# Patient Record
Sex: MALE | Race: WHITE | NOT HISPANIC OR LATINO | Employment: OTHER | URBAN - METROPOLITAN AREA
[De-identification: names, ages, dates, MRNs, and addresses within clinical notes are randomized per-mention and may not be internally consistent; named-entity substitution may affect disease eponyms.]

---

## 2018-04-13 NOTE — PATIENT DISCUSSION
Discussed importance of compliance with ocular meds and follow up exams to prevent loss of vision. English

## 2018-05-04 NOTE — PATIENT DISCUSSION
Patient informed on need for yag prior to H[facility]P [facility] if needed.

## 2018-07-12 NOTE — PROCEDURE NOTE: SURGICAL
Prior to commencing surgery patient identification, surgical procedure, site, and side were confirmed by Dr. Harish Peng. Following topical proparacaine anesthesia, the patient was positioned at the YAG laser, a contact lens coupled to the cornea with methylcellulose and an axial posterior capsulotomy performed without complication using 2.8 Mj x 35. Excess methylcellulose was washed from the eye, one drop of Alphagan was instilled and the patient returned to the holding area having tolerated the procedure well and without complication. <br />[facility]:[id number]

## 2018-07-12 NOTE — PATIENT DISCUSSION
Patient informed on need for yag prior to H[facility]P [facility] if needed.

## 2018-07-12 NOTE — PATIENT DISCUSSION
Patient informed on need for yag prior to [facility]P [facility] if needed.

## 2018-07-19 NOTE — PATIENT DISCUSSION
Patient informed on need for yag prior to H[facility]P [facility] if needed.

## 2018-07-19 NOTE — PATIENT DISCUSSION
Patient informed on need for yag prior to [facility]P [facility] if needed.

## 2018-07-19 NOTE — PROCEDURE NOTE: SURGICAL
Prior to commencing surgery patient identification, surgical procedure, site, and side were confirmed by Dr. Rodrick Begum. Following topical proparacaine anesthesia, the patient was positioned at the YAG laser, a contact lens coupled to the cornea with methylcellulose and an axial posterior capsulotomy performed without complication using 3.5 Mj x 21. Excess methylcellulose was washed from the eye, one drop of Alphagan was instilled and the patient returned to the holding area having tolerated the procedure well and without complication. <br />[facility]:[id number]

## 2018-07-26 NOTE — PATIENT DISCUSSION
patient informed on need for stable mr prior to lasik.  Once stable, consider trial cl to simulate LVC Goal OU.

## 2018-07-26 NOTE — PATIENT DISCUSSION
Patient informed that ERM might be causing decrease in vision at distance along with uncorrected astigmatism OD.

## 2019-07-24 ENCOUNTER — NEW PATIENT EMERGENCY (OUTPATIENT)
Dept: URBAN - METROPOLITAN AREA CLINIC 39 | Facility: CLINIC | Age: 84
End: 2019-07-24

## 2019-07-24 DIAGNOSIS — H25.812: ICD-10-CM

## 2019-07-24 DIAGNOSIS — H25.811: ICD-10-CM

## 2019-07-24 DIAGNOSIS — H16.041: ICD-10-CM

## 2019-07-24 DIAGNOSIS — H40.033: ICD-10-CM

## 2019-07-24 PROCEDURE — 99203 OFFICE O/P NEW LOW 30 MIN: CPT

## 2019-07-24 RX ORDER — PREDNISOLONE ACETATE 10 MG/ML
1 SUSPENSION/ DROPS OPHTHALMIC
Start: 2019-07-24

## 2019-07-24 ASSESSMENT — VISUAL ACUITY: OD_SC: 20/40

## 2019-07-24 ASSESSMENT — TONOMETRY: OD_IOP_MMHG: 14

## 2019-08-19 ENCOUNTER — ESTABLISHED COMPREHENSIVE EXAM (OUTPATIENT)
Dept: URBAN - METROPOLITAN AREA CLINIC 40 | Facility: CLINIC | Age: 84
End: 2019-08-19

## 2019-08-19 DIAGNOSIS — H52.4: ICD-10-CM

## 2019-08-19 DIAGNOSIS — H52.203: ICD-10-CM

## 2019-08-19 DIAGNOSIS — H25.811: ICD-10-CM

## 2019-08-19 DIAGNOSIS — H25.812: ICD-10-CM

## 2019-08-19 DIAGNOSIS — H40.033: ICD-10-CM

## 2019-08-19 DIAGNOSIS — H52.03: ICD-10-CM

## 2019-08-19 PROCEDURE — 92132 CPTRZD OPH DX IMG ANT SGM: CPT

## 2019-08-19 PROCEDURE — 92014 COMPRE OPH EXAM EST PT 1/>: CPT

## 2019-08-19 PROCEDURE — 92015 DETERMINE REFRACTIVE STATE: CPT

## 2019-08-19 ASSESSMENT — VISUAL ACUITY
OS_SC: 20/40
OS_SC: J6
OD_SC: J6
OD_SC: 20/25
OU_SC: 20/30
OU_SC: J3

## 2019-08-19 ASSESSMENT — TONOMETRY
OS_IOP_MMHG: 13
OD_IOP_MMHG: 12

## 2020-08-01 NOTE — PATIENT DISCUSSION
-- DO NOT REPLY / DO NOT REPLY ALL --  -- Message is from the [facility]--    COVID-19 Universal Screening: N/A - Not about scheduling    General Patient Message      Reason for Call: patient missed a call from the physician... please contact the patient back.    Caller Information       Type Contact Phone    08/01/2020 10:12 AM Phone (Incoming) Fabiola Benedict (Self) [phone] (H)          Alternative phone number: [phone]    Turnaround time given to caller:   \"This message will be sent to [state Provider's name]. The clinical team will fulfill your request as soon as they review your message.\"     Recommended observation.

## 2020-08-24 ENCOUNTER — ESTABLISHED COMPREHENSIVE EXAM (OUTPATIENT)
Dept: URBAN - METROPOLITAN AREA CLINIC 40 | Facility: CLINIC | Age: 85
End: 2020-08-24

## 2020-08-24 DIAGNOSIS — H25.811: ICD-10-CM

## 2020-08-24 DIAGNOSIS — H52.203: ICD-10-CM

## 2020-08-24 DIAGNOSIS — H52.03: ICD-10-CM

## 2020-08-24 DIAGNOSIS — H25.812: ICD-10-CM

## 2020-08-24 DIAGNOSIS — H52.4: ICD-10-CM

## 2020-08-24 DIAGNOSIS — H40.033: ICD-10-CM

## 2020-08-24 PROCEDURE — 92020 GONIOSCOPY: CPT

## 2020-08-24 PROCEDURE — 92015 DETERMINE REFRACTIVE STATE: CPT

## 2020-08-24 PROCEDURE — 92014 COMPRE OPH EXAM EST PT 1/>: CPT

## 2020-08-24 ASSESSMENT — VISUAL ACUITY
OU_SC: J10
OU_SC: 20/50-2
OS_BAT: 20/80
OD_BAT: 20/400
OD_SC: 20/60-2
OS_SC: <J12
OD_SC: <J12
OS_SC: 20/50-2

## 2020-08-24 ASSESSMENT — TONOMETRY
OD_IOP_MMHG: 16
OS_IOP_MMHG: 17

## 2020-11-09 ENCOUNTER — CATARACT CONSULT (OUTPATIENT)
Dept: URBAN - METROPOLITAN AREA CLINIC 39 | Facility: CLINIC | Age: 85
End: 2020-11-09

## 2020-11-09 DIAGNOSIS — H35.373: ICD-10-CM

## 2020-11-09 DIAGNOSIS — H25.811: ICD-10-CM

## 2020-11-09 DIAGNOSIS — H25.812: ICD-10-CM

## 2020-11-09 DIAGNOSIS — H40.033: ICD-10-CM

## 2020-11-09 PROCEDURE — V2799PMN IMPRIMIS PRED-MOXI-NEPAF 5ML

## 2020-11-09 PROCEDURE — 99214 OFFICE O/P EST MOD 30 MIN: CPT

## 2020-11-09 PROCEDURE — 92134 CPTRZ OPH DX IMG PST SGM RTA: CPT

## 2020-11-09 PROCEDURE — 92136TC INTERFEROMETRY - TECHNICAL COMPONENT

## 2020-11-09 ASSESSMENT — TONOMETRY
OD_IOP_MMHG: 09
OS_IOP_MMHG: 10

## 2020-11-09 ASSESSMENT — VISUAL ACUITY
OS_SC: J12
OD_SC: 20/60-1
OD_PH: 20/30
OS_AM: 20/20
OS_BAT: 20/80
OD_RAM: 20/25
OS_SC: 20/40-1
OD_SC: J12
OD_BAT: 20/400

## 2020-11-30 ENCOUNTER — SURGERY/PROCEDURE (OUTPATIENT)
Dept: URBAN - METROPOLITAN AREA CLINIC 39 | Facility: CLINIC | Age: 85
End: 2020-11-30

## 2020-11-30 ENCOUNTER — PRE-OP/H&P (OUTPATIENT)
Dept: URBAN - METROPOLITAN AREA CLINIC 39 | Facility: CLINIC | Age: 85
End: 2020-11-30

## 2020-11-30 DIAGNOSIS — H57.03: ICD-10-CM

## 2020-11-30 DIAGNOSIS — H21.81: ICD-10-CM

## 2020-11-30 DIAGNOSIS — H25.811: ICD-10-CM

## 2020-11-30 PROCEDURE — 66982 XCAPSL CTRC RMVL CPLX WO ECP: CPT

## 2020-11-30 PROCEDURE — 99211T TECH SERVICE

## 2020-12-01 ENCOUNTER — POST OP/EVAL OF SECOND EYE (OUTPATIENT)
Dept: URBAN - METROPOLITAN AREA CLINIC 39 | Facility: CLINIC | Age: 85
End: 2020-12-01

## 2020-12-01 DIAGNOSIS — Z96.1: ICD-10-CM

## 2020-12-01 DIAGNOSIS — H25.812: ICD-10-CM

## 2020-12-01 PROCEDURE — 99024 POSTOP FOLLOW-UP VISIT: CPT

## 2020-12-01 PROCEDURE — 92012 INTRM OPH EXAM EST PATIENT: CPT

## 2020-12-01 ASSESSMENT — TONOMETRY
OD_IOP_MMHG: 14
OS_IOP_MMHG: 12

## 2020-12-01 ASSESSMENT — VISUAL ACUITY
OD_SC: 20/50
OS_BAT: 20/80

## 2020-12-07 ENCOUNTER — SURGERY/PROCEDURE (OUTPATIENT)
Dept: URBAN - METROPOLITAN AREA CLINIC 39 | Facility: CLINIC | Age: 85
End: 2020-12-07

## 2020-12-07 ENCOUNTER — PRE-OP/H&P (OUTPATIENT)
Dept: URBAN - METROPOLITAN AREA CLINIC 39 | Facility: CLINIC | Age: 85
End: 2020-12-07

## 2020-12-07 DIAGNOSIS — H25.812: ICD-10-CM

## 2020-12-07 DIAGNOSIS — H57.03: ICD-10-CM

## 2020-12-07 DIAGNOSIS — Z96.1: ICD-10-CM

## 2020-12-07 PROCEDURE — 66982 XCAPSL CTRC RMVL CPLX WO ECP: CPT

## 2020-12-07 PROCEDURE — 99211T TECH SERVICE

## 2020-12-07 ASSESSMENT — TONOMETRY
OS_IOP_MMHG: 13
OD_IOP_MMHG: 13

## 2020-12-07 ASSESSMENT — VISUAL ACUITY
OD_SC: 20/25
OS_BAT: 20/80
OS_SC: J12
OD_SC: J12
OS_SC: 20/40-1

## 2020-12-08 ENCOUNTER — CATARACT POST-OP 1-DAY (OUTPATIENT)
Dept: URBAN - METROPOLITAN AREA CLINIC 39 | Facility: CLINIC | Age: 85
End: 2020-12-08

## 2020-12-08 DIAGNOSIS — Z96.1: ICD-10-CM

## 2020-12-08 PROCEDURE — 99024 POSTOP FOLLOW-UP VISIT: CPT

## 2020-12-08 ASSESSMENT — TONOMETRY
OS_IOP_MMHG: 14
OD_IOP_MMHG: 12

## 2020-12-08 ASSESSMENT — VISUAL ACUITY
OD_SC: 20/20-2
OS_SC: 20/30-2
OU_SC: 20/20-1
OU_SC: J3
OS_SC: J5
OD_SC: J6

## 2020-12-22 NOTE — PATIENT DISCUSSION
12/22/2020:  Ed ERM OD limits BCVA OD and this IS WHY doesn't see distance as well as she would wish to.  Patient understands condition, prognosis and need for follow up care.

## 2021-01-13 ENCOUNTER — POST-OP CATARACT (OUTPATIENT)
Dept: URBAN - METROPOLITAN AREA CLINIC 39 | Facility: CLINIC | Age: 86
End: 2021-01-13

## 2021-01-13 DIAGNOSIS — Z96.1: ICD-10-CM

## 2021-01-13 PROCEDURE — 99024 POSTOP FOLLOW-UP VISIT: CPT

## 2021-01-13 ASSESSMENT — VISUAL ACUITY
OS_SC: 20/40
OD_SC: 20/20-1
OD_SC: J5
OS_SC: J3

## 2021-01-13 ASSESSMENT — TONOMETRY
OD_IOP_MMHG: 10
OS_IOP_MMHG: 10

## 2021-07-14 ENCOUNTER — ESTABLISHED COMPREHENSIVE EXAM (OUTPATIENT)
Dept: URBAN - METROPOLITAN AREA CLINIC 39 | Facility: CLINIC | Age: 86
End: 2021-07-14

## 2021-07-14 DIAGNOSIS — H26.492: ICD-10-CM

## 2021-07-14 DIAGNOSIS — H52.4: ICD-10-CM

## 2021-07-14 DIAGNOSIS — H26.491: ICD-10-CM

## 2021-07-14 DIAGNOSIS — H52.03: ICD-10-CM

## 2021-07-14 DIAGNOSIS — H52.213: ICD-10-CM

## 2021-07-14 DIAGNOSIS — H35.373: ICD-10-CM

## 2021-07-14 PROCEDURE — 92015 DETERMINE REFRACTIVE STATE: CPT

## 2021-07-14 PROCEDURE — 92014 COMPRE OPH EXAM EST PT 1/>: CPT

## 2021-07-14 ASSESSMENT — VISUAL ACUITY
OD_SC: 20/25
OU_SC: J3
OD_SC: J6
OU_SC: 20/25+2
OS_SC: J3
OS_SC: 20/30-2

## 2021-07-14 ASSESSMENT — TONOMETRY
OD_IOP_MMHG: 10
OS_IOP_MMHG: 8

## 2021-10-11 ENCOUNTER — APPOINTMENT (OUTPATIENT)
Dept: CT IMAGING | Age: 86
DRG: 177 | End: 2021-10-11
Payer: MEDICARE

## 2021-10-11 ENCOUNTER — HOSPITAL ENCOUNTER (INPATIENT)
Age: 86
LOS: 2 days | Discharge: HOME | DRG: 177 | End: 2021-10-13
Attending: EMERGENCY MEDICINE | Admitting: INTERNAL MEDICINE
Payer: MEDICARE

## 2021-10-11 ENCOUNTER — APPOINTMENT (OUTPATIENT)
Dept: GENERAL RADIOLOGY | Age: 86
DRG: 177 | End: 2021-10-11
Payer: MEDICARE

## 2021-10-11 DIAGNOSIS — U07.1 ACUTE RESPIRATORY DISEASE DUE TO COVID-19 VIRUS: Primary | ICD-10-CM

## 2021-10-11 DIAGNOSIS — J44.9 CHRONIC OBSTRUCTIVE PULMONARY DISEASE, UNSPECIFIED COPD TYPE (HCC): ICD-10-CM

## 2021-10-11 DIAGNOSIS — J06.9 ACUTE RESPIRATORY DISEASE DUE TO COVID-19 VIRUS: Primary | ICD-10-CM

## 2021-10-11 PROBLEM — J96.00 ACUTE RESPIRATORY FAILURE DUE TO COVID-19 (HCC): Status: ACTIVE | Noted: 2021-10-11

## 2021-10-11 LAB
ADENOVIRUS BY PCR: NOT DETECTED
ANION GAP SERPL CALCULATED.3IONS-SCNC: 12 MMOL/L (ref 7–16)
ANISOCYTOSIS: ABNORMAL
BASOPHILS ABSOLUTE: 0.03 E9/L (ref 0–0.2)
BASOPHILS RELATIVE PERCENT: 0.5 % (ref 0–2)
BORDETELLA PARAPERTUSSIS BY PCR: NOT DETECTED
BORDETELLA PERTUSSIS BY PCR: NOT DETECTED
BUN BLDV-MCNC: 17 MG/DL (ref 6–23)
C-REACTIVE PROTEIN: 2.6 MG/DL (ref 0–0.4)
C-REACTIVE PROTEIN: 2.8 MG/DL (ref 0–0.4)
CALCIUM SERPL-MCNC: 9.3 MG/DL (ref 8.6–10.2)
CHLAMYDOPHILIA PNEUMONIAE BY PCR: NOT DETECTED
CHLORIDE BLD-SCNC: 100 MMOL/L (ref 98–107)
CO2: 26 MMOL/L (ref 22–29)
CORONAVIRUS 229E BY PCR: NOT DETECTED
CORONAVIRUS HKU1 BY PCR: NOT DETECTED
CORONAVIRUS NL63 BY PCR: NOT DETECTED
CORONAVIRUS OC43 BY PCR: NOT DETECTED
CREAT SERPL-MCNC: 1.2 MG/DL (ref 0.7–1.2)
D DIMER: 327 NG/ML DDU
EKG ATRIAL RATE: 77 BPM
EKG P AXIS: 73 DEGREES
EKG P-R INTERVAL: 142 MS
EKG Q-T INTERVAL: 444 MS
EKG QRS DURATION: 114 MS
EKG QTC CALCULATION (BAZETT): 502 MS
EKG R AXIS: -16 DEGREES
EKG T AXIS: -165 DEGREES
EKG VENTRICULAR RATE: 77 BPM
EOSINOPHILS ABSOLUTE: 0.1 E9/L (ref 0.05–0.5)
EOSINOPHILS RELATIVE PERCENT: 1.7 % (ref 0–6)
GFR AFRICAN AMERICAN: >60
GFR NON-AFRICAN AMERICAN: 58 ML/MIN/1.73
GLUCOSE BLD-MCNC: 107 MG/DL (ref 74–99)
HCT VFR BLD CALC: 36.8 % (ref 37–54)
HEMOGLOBIN: 12.2 G/DL (ref 12.5–16.5)
HUMAN METAPNEUMOVIRUS BY PCR: NOT DETECTED
HUMAN RHINOVIRUS/ENTEROVIRUS BY PCR: NOT DETECTED
IMMATURE GRANULOCYTES #: 0.03 E9/L
IMMATURE GRANULOCYTES %: 0.5 % (ref 0–5)
INFLUENZA A BY PCR: NOT DETECTED
INFLUENZA B BY PCR: NOT DETECTED
INR BLD: 1.2
LACTATE DEHYDROGENASE: 225 U/L (ref 135–225)
LACTATE DEHYDROGENASE: 235 U/L (ref 135–225)
LYMPHOCYTES ABSOLUTE: 0.49 E9/L (ref 1.5–4)
LYMPHOCYTES RELATIVE PERCENT: 8.5 % (ref 20–42)
MCH RBC QN AUTO: 33.5 PG (ref 26–35)
MCHC RBC AUTO-ENTMCNC: 33.2 % (ref 32–34.5)
MCV RBC AUTO: 101.1 FL (ref 80–99.9)
MONOCYTES ABSOLUTE: 0.61 E9/L (ref 0.1–0.95)
MONOCYTES RELATIVE PERCENT: 10.5 % (ref 2–12)
MYCOPLASMA PNEUMONIAE BY PCR: NOT DETECTED
NEUTROPHILS ABSOLUTE: 4.53 E9/L (ref 1.8–7.3)
NEUTROPHILS RELATIVE PERCENT: 78.3 % (ref 43–80)
PARAINFLUENZA VIRUS 1 BY PCR: NOT DETECTED
PARAINFLUENZA VIRUS 2 BY PCR: NOT DETECTED
PARAINFLUENZA VIRUS 3 BY PCR: NOT DETECTED
PARAINFLUENZA VIRUS 4 BY PCR: NOT DETECTED
PDW BLD-RTO: 13.6 FL (ref 11.5–15)
PLATELET # BLD: 162 E9/L (ref 130–450)
PMV BLD AUTO: 9.9 FL (ref 7–12)
POTASSIUM SERPL-SCNC: 3.8 MMOL/L (ref 3.5–5)
PRO-BNP: 1024 PG/ML (ref 0–450)
PROCALCITONIN: 0.11 NG/ML (ref 0–0.08)
PROTHROMBIN TIME: 13.7 SEC (ref 9.3–12.4)
RBC # BLD: 3.64 E12/L (ref 3.8–5.8)
REASON FOR REJECTION: NORMAL
REJECTED TEST: NORMAL
RESPIRATORY SYNCYTIAL VIRUS BY PCR: NOT DETECTED
SARS-COV-2, NAAT: DETECTED
SARS-COV-2, PCR: DETECTED
SEDIMENTATION RATE, ERYTHROCYTE: 9 MM/HR (ref 0–15)
SODIUM BLD-SCNC: 138 MMOL/L (ref 132–146)
TROPONIN, HIGH SENSITIVITY: 42 NG/L (ref 0–11)
TROPONIN, HIGH SENSITIVITY: 43 NG/L (ref 0–11)
TROPONIN, HIGH SENSITIVITY: 43 NG/L (ref 0–11)
WBC # BLD: 5.8 E9/L (ref 4.5–11.5)

## 2021-10-11 PROCEDURE — 86140 C-REACTIVE PROTEIN: CPT

## 2021-10-11 PROCEDURE — 71045 X-RAY EXAM CHEST 1 VIEW: CPT

## 2021-10-11 PROCEDURE — 84484 ASSAY OF TROPONIN QUANT: CPT

## 2021-10-11 PROCEDURE — 85378 FIBRIN DEGRADE SEMIQUANT: CPT

## 2021-10-11 PROCEDURE — 99223 1ST HOSP IP/OBS HIGH 75: CPT | Performed by: INTERNAL MEDICINE

## 2021-10-11 PROCEDURE — 2580000003 HC RX 258: Performed by: INTERNAL MEDICINE

## 2021-10-11 PROCEDURE — 2580000003 HC RX 258: Performed by: NURSE PRACTITIONER

## 2021-10-11 PROCEDURE — 99285 EMERGENCY DEPT VISIT HI MDM: CPT

## 2021-10-11 PROCEDURE — 85610 PROTHROMBIN TIME: CPT

## 2021-10-11 PROCEDURE — 96374 THER/PROPH/DIAG INJ IV PUSH: CPT

## 2021-10-11 PROCEDURE — 83880 ASSAY OF NATRIURETIC PEPTIDE: CPT

## 2021-10-11 PROCEDURE — 6360000004 HC RX CONTRAST MEDICATION: Performed by: RADIOLOGY

## 2021-10-11 PROCEDURE — 6370000000 HC RX 637 (ALT 250 FOR IP): Performed by: NURSE PRACTITIONER

## 2021-10-11 PROCEDURE — 0202U NFCT DS 22 TRGT SARS-COV-2: CPT

## 2021-10-11 PROCEDURE — 93005 ELECTROCARDIOGRAM TRACING: CPT | Performed by: EMERGENCY MEDICINE

## 2021-10-11 PROCEDURE — 6360000002 HC RX W HCPCS: Performed by: EMERGENCY MEDICINE

## 2021-10-11 PROCEDURE — 6370000000 HC RX 637 (ALT 250 FOR IP): Performed by: EMERGENCY MEDICINE

## 2021-10-11 PROCEDURE — 84145 PROCALCITONIN (PCT): CPT

## 2021-10-11 PROCEDURE — 80048 BASIC METABOLIC PNL TOTAL CA: CPT

## 2021-10-11 PROCEDURE — 71275 CT ANGIOGRAPHY CHEST: CPT

## 2021-10-11 PROCEDURE — 2060000000 HC ICU INTERMEDIATE R&B

## 2021-10-11 PROCEDURE — 85025 COMPLETE CBC W/AUTO DIFF WBC: CPT

## 2021-10-11 PROCEDURE — 6360000002 HC RX W HCPCS: Performed by: NURSE PRACTITIONER

## 2021-10-11 PROCEDURE — 94664 DEMO&/EVAL PT USE INHALER: CPT

## 2021-10-11 PROCEDURE — 87635 SARS-COV-2 COVID-19 AMP PRB: CPT

## 2021-10-11 PROCEDURE — 94640 AIRWAY INHALATION TREATMENT: CPT

## 2021-10-11 PROCEDURE — 83615 LACTATE (LD) (LDH) ENZYME: CPT

## 2021-10-11 PROCEDURE — 99223 1ST HOSP IP/OBS HIGH 75: CPT | Performed by: NURSE PRACTITIONER

## 2021-10-11 PROCEDURE — 85651 RBC SED RATE NONAUTOMATED: CPT

## 2021-10-11 RX ORDER — ACETAMINOPHEN 325 MG/1
650 TABLET ORAL EVERY 6 HOURS PRN
Status: DISCONTINUED | OUTPATIENT
Start: 2021-10-11 | End: 2021-10-13 | Stop reason: HOSPADM

## 2021-10-11 RX ORDER — METOPROLOL SUCCINATE 25 MG/1
25 TABLET, EXTENDED RELEASE ORAL DAILY
COMMUNITY

## 2021-10-11 RX ORDER — ROSUVASTATIN CALCIUM 5 MG/1
5 TABLET, COATED ORAL NIGHTLY
COMMUNITY

## 2021-10-11 RX ORDER — CLOPIDOGREL BISULFATE 75 MG/1
75 TABLET ORAL DAILY
COMMUNITY

## 2021-10-11 RX ORDER — LEVOTHYROXINE SODIUM 0.07 MG/1
75 TABLET ORAL DAILY
COMMUNITY

## 2021-10-11 RX ORDER — CLOPIDOGREL BISULFATE 75 MG/1
75 TABLET ORAL DAILY
Status: DISCONTINUED | OUTPATIENT
Start: 2021-10-11 | End: 2021-10-13 | Stop reason: HOSPADM

## 2021-10-11 RX ORDER — ALBUTEROL SULFATE 90 UG/1
2 AEROSOL, METERED RESPIRATORY (INHALATION) EVERY 4 HOURS PRN
Status: DISCONTINUED | OUTPATIENT
Start: 2021-10-11 | End: 2021-10-13 | Stop reason: HOSPADM

## 2021-10-11 RX ORDER — ALBUTEROL SULFATE 90 UG/1
2 AEROSOL, METERED RESPIRATORY (INHALATION) EVERY 6 HOURS PRN
COMMUNITY

## 2021-10-11 RX ORDER — ESCITALOPRAM OXALATE 10 MG/1
10 TABLET ORAL DAILY
COMMUNITY

## 2021-10-11 RX ORDER — SODIUM CHLORIDE 9 MG/ML
INJECTION, SOLUTION INTRAVENOUS CONTINUOUS
Status: DISCONTINUED | OUTPATIENT
Start: 2021-10-11 | End: 2021-10-12

## 2021-10-11 RX ORDER — CYCLOBENZAPRINE HCL 5 MG
5 TABLET ORAL 3 TIMES DAILY PRN
COMMUNITY

## 2021-10-11 RX ORDER — MEMANTINE HYDROCHLORIDE 10 MG/1
10 TABLET ORAL 2 TIMES DAILY
COMMUNITY

## 2021-10-11 RX ORDER — FUROSEMIDE 40 MG/1
40 TABLET ORAL 2 TIMES DAILY
COMMUNITY

## 2021-10-11 RX ORDER — PANTOPRAZOLE SODIUM 40 MG/1
40 TABLET, DELAYED RELEASE ORAL NIGHTLY
COMMUNITY

## 2021-10-11 RX ORDER — CYCLOBENZAPRINE HCL 10 MG
5 TABLET ORAL 3 TIMES DAILY PRN
Status: DISCONTINUED | OUTPATIENT
Start: 2021-10-11 | End: 2021-10-13 | Stop reason: HOSPADM

## 2021-10-11 RX ORDER — DEXAMETHASONE SODIUM PHOSPHATE 10 MG/ML
6 INJECTION INTRAMUSCULAR; INTRAVENOUS EVERY 24 HOURS
Status: DISCONTINUED | OUTPATIENT
Start: 2021-10-11 | End: 2021-10-12

## 2021-10-11 RX ORDER — FENOFIBRATE 145 MG/1
145 TABLET, COATED ORAL DAILY
COMMUNITY

## 2021-10-11 RX ORDER — METOPROLOL SUCCINATE 25 MG/1
25 TABLET, EXTENDED RELEASE ORAL DAILY
Status: DISCONTINUED | OUTPATIENT
Start: 2021-10-11 | End: 2021-10-13 | Stop reason: HOSPADM

## 2021-10-11 RX ORDER — ASPIRIN 81 MG/1
81 TABLET ORAL DAILY
Status: DISCONTINUED | OUTPATIENT
Start: 2021-10-11 | End: 2021-10-13 | Stop reason: HOSPADM

## 2021-10-11 RX ORDER — METHYLPREDNISOLONE SODIUM SUCCINATE 125 MG/2ML
80 INJECTION, POWDER, LYOPHILIZED, FOR SOLUTION INTRAMUSCULAR; INTRAVENOUS ONCE
Status: COMPLETED | OUTPATIENT
Start: 2021-10-11 | End: 2021-10-11

## 2021-10-11 RX ORDER — IPRATROPIUM BROMIDE AND ALBUTEROL SULFATE 2.5; .5 MG/3ML; MG/3ML
3 SOLUTION RESPIRATORY (INHALATION) ONCE
Status: COMPLETED | OUTPATIENT
Start: 2021-10-11 | End: 2021-10-11

## 2021-10-11 RX ORDER — ACETAMINOPHEN 650 MG/1
650 SUPPOSITORY RECTAL EVERY 6 HOURS PRN
Status: DISCONTINUED | OUTPATIENT
Start: 2021-10-11 | End: 2021-10-13 | Stop reason: HOSPADM

## 2021-10-11 RX ORDER — ESCITALOPRAM OXALATE 10 MG/1
10 TABLET ORAL DAILY
Status: DISCONTINUED | OUTPATIENT
Start: 2021-10-11 | End: 2021-10-13 | Stop reason: HOSPADM

## 2021-10-11 RX ORDER — LEVOTHYROXINE SODIUM 0.07 MG/1
75 TABLET ORAL DAILY
Status: DISCONTINUED | OUTPATIENT
Start: 2021-10-11 | End: 2021-10-13 | Stop reason: HOSPADM

## 2021-10-11 RX ORDER — SODIUM CHLORIDE 0.9 % (FLUSH) 0.9 %
10 SYRINGE (ML) INJECTION PRN
Status: DISCONTINUED | OUTPATIENT
Start: 2021-10-11 | End: 2021-10-13 | Stop reason: HOSPADM

## 2021-10-11 RX ORDER — PANTOPRAZOLE SODIUM 40 MG/1
40 TABLET, DELAYED RELEASE ORAL NIGHTLY
Status: DISCONTINUED | OUTPATIENT
Start: 2021-10-11 | End: 2021-10-13 | Stop reason: HOSPADM

## 2021-10-11 RX ORDER — MEMANTINE HYDROCHLORIDE 10 MG/1
10 TABLET ORAL 2 TIMES DAILY
Status: DISCONTINUED | OUTPATIENT
Start: 2021-10-11 | End: 2021-10-13 | Stop reason: HOSPADM

## 2021-10-11 RX ORDER — TAMSULOSIN HYDROCHLORIDE 0.4 MG/1
0.4 CAPSULE ORAL NIGHTLY
COMMUNITY

## 2021-10-11 RX ORDER — ASPIRIN 81 MG/1
81 TABLET ORAL DAILY
COMMUNITY

## 2021-10-11 RX ORDER — HYDROCODONE BITARTRATE AND ACETAMINOPHEN 10; 325 MG/1; MG/1
1 TABLET ORAL EVERY 6 HOURS PRN
COMMUNITY

## 2021-10-11 RX ORDER — TAMSULOSIN HYDROCHLORIDE 0.4 MG/1
0.4 CAPSULE ORAL NIGHTLY
Status: DISCONTINUED | OUTPATIENT
Start: 2021-10-11 | End: 2021-10-13 | Stop reason: HOSPADM

## 2021-10-11 RX ADMIN — MEMANTINE HYDROCHLORIDE 10 MG: 10 TABLET, FILM COATED ORAL at 12:58

## 2021-10-11 RX ADMIN — ALBUTEROL SULFATE 2 PUFF: 90 AEROSOL, METERED RESPIRATORY (INHALATION) at 20:59

## 2021-10-11 RX ADMIN — DEXAMETHASONE SODIUM PHOSPHATE 6 MG: 10 INJECTION INTRAMUSCULAR; INTRAVENOUS at 12:59

## 2021-10-11 RX ADMIN — METOPROLOL SUCCINATE 25 MG: 25 TABLET, EXTENDED RELEASE ORAL at 12:59

## 2021-10-11 RX ADMIN — IOPAMIDOL 75 ML: 755 INJECTION, SOLUTION INTRAVENOUS at 08:11

## 2021-10-11 RX ADMIN — ENOXAPARIN SODIUM 40 MG: 40 INJECTION SUBCUTANEOUS at 12:58

## 2021-10-11 RX ADMIN — SODIUM CHLORIDE: 9 INJECTION, SOLUTION INTRAVENOUS at 23:34

## 2021-10-11 RX ADMIN — MEMANTINE HYDROCHLORIDE 10 MG: 10 TABLET, FILM COATED ORAL at 20:53

## 2021-10-11 RX ADMIN — CLOPIDOGREL BISULFATE 75 MG: 75 TABLET ORAL at 12:58

## 2021-10-11 RX ADMIN — SODIUM CHLORIDE, PRESERVATIVE FREE 10 ML: 5 INJECTION INTRAVENOUS at 13:00

## 2021-10-11 RX ADMIN — ASPIRIN 81 MG: 81 TABLET, COATED ORAL at 12:59

## 2021-10-11 RX ADMIN — LEVOTHYROXINE SODIUM 75 MCG: 75 TABLET ORAL at 12:59

## 2021-10-11 RX ADMIN — METHYLPREDNISOLONE SODIUM SUCCINATE 80 MG: 125 INJECTION, POWDER, FOR SOLUTION INTRAMUSCULAR; INTRAVENOUS at 07:17

## 2021-10-11 RX ADMIN — ALBUTEROL SULFATE 2 PUFF: 90 AEROSOL, METERED RESPIRATORY (INHALATION) at 12:58

## 2021-10-11 RX ADMIN — TAMSULOSIN HYDROCHLORIDE 0.4 MG: 0.4 CAPSULE ORAL at 20:54

## 2021-10-11 RX ADMIN — IPRATROPIUM BROMIDE AND ALBUTEROL SULFATE 3 AMPULE: .5; 3 SOLUTION RESPIRATORY (INHALATION) at 06:47

## 2021-10-11 RX ADMIN — PANTOPRAZOLE SODIUM 40 MG: 40 TABLET, DELAYED RELEASE ORAL at 20:54

## 2021-10-11 ASSESSMENT — ENCOUNTER SYMPTOMS
EYE PAIN: 0
SHORTNESS OF BREATH: 1
NAUSEA: 0
SINUS PRESSURE: 0
VOMITING: 0
ABDOMINAL PAIN: 0
EYE REDNESS: 0
SORE THROAT: 0
BACK PAIN: 0
WHEEZING: 1
EYE DISCHARGE: 0
SWOLLEN GLANDS: 0
DIARRHEA: 0
COUGH: 0

## 2021-10-11 NOTE — H&P
[place] [address] [facility]   History and Physical      CHIEF COMPLAINT:  sob    History of Present Illness:  82 y.o. male with a history of bph,mi,hpl,htn,copd,hypothyroid presents with sob, somewhat of a poor historian. He states his breathing has been worsening over the last couple of days. He states he is originally from [place] but here in [facility]. Does have hx of asthma, breathing worsening over the last few days. Has harsh nonproductive cough. Denies any active chest pain. Reports having b/l leg weakness and falling at home, roughly 2 times. Denies any injuries. Does have some bruising to left arm. Initially was found to be hypoxic in ED with spo2 of 86% on RA. Started on 4LNC and spo2 inc to mid 90's. Was also given neb and steroid. CXR negative. CTA chest was also negative. He was + for covid 19. He states he is fully vaccinated. He currently reports feeling \"okay. \" he denies any sob with rest but does get mildly sob w/ exertion. He is afebrile. No chills or chest pain. No abd pain. No edema. Plan to admit for further evaluation and monitoring. Informant(s) for H&P:self    REVIEW OF SYSTEMS:  no fevers, chills, cp, sob, n/v, ha, vision/hearing changes, wt changes, hot/cold flashes, other open skin lesions, diarrhea, constipation, dysuria/hematuria unless noted in HPI. Complete ROS performed with the patient and is otherwise negative. PMH:  Past Medical History:   Diagnosis Date    Chronic kidney disease     COPD (chronic obstructive pulmonary disease) (HCC)     Hyperlipidemia     Hypertension     MI [facility]myocardial infarction) [facility]     Thyroid disease        Surgical History:  Past Surgical History:   Procedure Laterality Date    CERVICAL FUSION      CORONARY ANGIOPLASTY WITH STENT PLACEMENT      JOINT REPLACEMENT Bilateral     knee       Medications Prior to Admission:    Prior to Admission medications    Medication Sig Start Date End Date Taking?  Authorizing Provider albuterol sulfate HFA (VENTOLIN HFA) 108 (90 Base) MCG/ACT inhaler Inhale 2 puffs into the lungs every 6 hours as needed for Wheezing or Shortness of Breath    Yes Historical Provider, MD   tiotropium (SPIRIVA) 18 MCG inhalation capsule Inhale 18 mcg into the lungs daily   Yes Historical Provider, MD   rosuvastatin (CRESTOR) 5 MG tablet Take 5 mg by mouth nightly    Yes Historical Provider, MD   tamsulosin (FLOMAX) 0.4 MG capsule Take 0.4 mg by mouth nightly    Yes Historical Provider, MD   cyclobenzaprine (FLEXERIL) 5 MG tablet Take 5 mg by mouth 3 times daily as needed for Muscle spasms   Yes Historical Provider, MD   memantine (NAMENDA) 10 MG tablet Take 10 mg by mouth 2 times daily    Yes Historical Provider, MD   levothyroxine (SYNTHROID) 75 MCG tablet Take 75 mcg by mouth Daily   Yes Historical Provider, MD   furosemide (LASIX) 40 MG tablet Take 40 mg by mouth 2 times daily   Yes Historical Provider, MD   fenofibrate (TRICOR) 145 MG tablet Take 145 mg by mouth daily    Yes Historical Provider, MD   escitalopram (LEXAPRO) 10 MG tablet Take 10 mg by mouth daily    Yes Historical Provider, MD   metoprolol succinate (TOPROL XL) 25 MG extended release tablet Take 25 mg by mouth daily    Yes Historical Provider, MD   aspirin 81 MG EC tablet Take 81 mg by mouth daily   Yes Historical Provider, MD   pantoprazole (PROTONIX) 40 MG tablet Take 40 mg by mouth nightly    Yes Historical Provider, MD   HYDROcodone-acetaminophen (NORCO)  MG per tablet Take 1 tablet by mouth every 6 hours as needed for Pain. Yes Historical Provider, MD   clopidogrel (PLAVIX) 75 MG tablet Take 75 mg by mouth daily   Yes Historical Provider, MD       Allergies:    Patient has no known allergies. Social History:        Family History: noncontributory  No family history on file.     PHYSICAL EXAM:  Vitals:  BP 99/63   Pulse 88   Temp 97.8 °F (36.6 °C) (Oral)   Resp 16   Ht 5' 10\" (1.778 m)   Wt 170 lb (77.1 kg)   SpO2 95%   BMI 24.39 kg/m²   General Appearance: alert and oriented to person, place and time, well developed and well- nourished, somewhat ill appearing  Skin: warm and dry, no rash or erythema  Head: normocephalic and atraumatic  Eyes: pupils equal, round, and reactive to light, extraocular eye movements intact, conjunctivae normal  Neck: supple and non-tender without mass, no thyromegaly or thyroid nodules, no cervical lymphadenopathy  Pulmonary/Chest: diminished b/l. No wheeze. On 4LNC no respiratory distress  Cardiovascular: normal rate, regular rhythm, normal S1 and S2, no murmurs, rubs, clicks, or gallops, distal pulses intact, no carotid bruits  Abdomen: soft, non-tender, non-distended, normal bowel sounds, no masses or organomegaly  Extremities: no cyanosis, clubbing or edema  Musculoskeletal: normal range of motion, no joint swelling, deformity or tenderness  Neurologic: reflexes normal and symmetric, no cranial nerve deficit, gait, coordination and speech normal      LABS:  Recent Labs     10/11/21  0651      K 3.8      CO2 26   BUN 17   CREATININE 1.2   GLUCOSE 107*   CALCIUM 9.3       Recent Labs     10/11/21  0748   WBC 5.8   RBC 3.64*   HGB 12.2*   HCT 36.8*   .1*   MCH 33.5   MCHC 33.2   RDW 13.6      MPV 9.9       No results for input(s): POCGLU in the last 72 hours.     CBC:   Lab Results   Component Value Date    WBC 5.8 10/11/2021    RBC 3.64 10/11/2021    HGB 12.2 10/11/2021    HCT 36.8 10/11/2021    .1 10/11/2021    MCH 33.5 10/11/2021    MCHC 33.2 10/11/2021    RDW 13.6 10/11/2021     10/11/2021    MPV 9.9 10/11/2021     CMP:    Lab Results   Component Value Date     10/11/2021    K 3.8 10/11/2021     10/11/2021    CO2 26 10/11/2021    BUN 17 10/11/2021    CREATININE 1.2 10/11/2021    GFRAA >60 10/11/2021    LABGLOM 58 10/11/2021    GLUCOSE 107 10/11/2021    CALCIUM 9.3 10/11/2021       Radiology: XR CHEST PORTABLE    Result Date: 10/11/2021  EXAMINATION: ONE XRAY VIEW OF THE CHEST 10/11/2021 7:38 am COMPARISON: None. HISTORY: ORDERING SYSTEM PROVIDED HISTORY: shortness of breath, chest pain TECHNOLOGIST PROVIDED HISTORY: Reason for exam:->shortness of breath, chest pain FINDINGS: The cardiac silhouette is at upper limits of normal in size. There is no mediastinal widening. The right lung is clear. There is elevation of the left hemidiaphragm. I suspect there is atelectasis within the left lung base. The left upper lobe is clear. 1. Significant elevation of left hemidiaphragm. I suspect there is adjacent compression atelectasis. 2. There is no gross consolidation within the left upper lobe or right lung. CTA PULMONARY W CONTRAST    Result Date: 10/11/2021  EXAMINATION: CTA OF THE CHEST 10/11/2021 8:11 am TECHNIQUE: CTA of the chest was performed after the administration of intravenous contrast.  Multiplanar reformatted images are provided for review. MIP images are provided for review. Dose modulation, iterative reconstruction, and/or weight based adjustment of the mA/kV was utilized to reduce the radiation dose to as low as reasonably achievable. COMPARISON: None. HISTORY: ORDERING SYSTEM PROVIDED HISTORY: eval for PE TECHNOLOGIST PROVIDED HISTORY: Reason for exam:->eval for PE Decision Support Exception - unselect if not a suspected or confirmed emergency medical condition->Emergency Medical Condition (MA) FINDINGS: Pulmonary Arteries: Pulmonary arteries are adequately opacified for evaluation. No evidence of intraluminal filling defect to suggest pulmonary embolism. Main pulmonary artery is normal in caliber. Mediastinum: No evidence of mediastinal lymphadenopathy. The heart and pericardium demonstrate no acute abnormality. There is no acute abnormality of the thoracic aorta. Lungs/pleura: The lungs are without acute process. No focal consolidation or pulmonary edema. No evidence of pleural effusion or pneumothorax.   There is chronic elevation of the left hemidiaphragm. There is minimal atelectasis seen within the left lung base adjacent to the chronically elevated diaphragm consistent with compression atelectasis. There are 2 linear density seen within the left upper lobe representing atelectasis also. Upper Abdomen: Limited images of the upper abdomen are unremarkable. Soft Tissues/Bones: There are advanced degenerative changes of the thoracic spine. There are postsurgical changes of the lower cervical and superior thoracic spine. 1. There is no evidence of a pulmonary embolus. 2. There are no findings of pneumonia 3. Chronic elevation of left hemidiaphragm with adjacent compression atelectasis within the left lung base posteriorly 4. 2 linear densities within the left upper lobe most consistent with linear atelectasis. EKG:     ASSESSMENT:      Active Problems:    Acute respiratory failure due to COVID-19 [facility])  Resolved Problems:    * No resolved hospital problems. *      PLAN:    1. Acute respiratory failure d/t covid 19 - fully vaccinated. Droplet precautions. On 4LNC, titrate as tolerated. Decadron 6mg IV daily. Continue w/ inhaler prn, Incentive spirometry. CRP pending- hold off on adding baricitinib for now. Plan to recheck labs in AM.  2. Weakness/falls at home - pt/ot c/s. check Orthostatic vitals. 3. 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 MDD - continue lexapro  5. htn - on toprol xl, ok to continue. Checking orthostatic vitals. 6. Pain from fall - flexeril and tylenol prn  7. Hx of MI - no active chest pain, continue ASA and plavix. 8. bph - on flomax, ok to continue for now. May be contributing to low bp. Code Status: full code   DVT prophylaxis: lovenox      Electronically signed by RACHEL Massey on 10/11/2021 at 9:42 AM      NOTE: This report was transcribed using voice recognition software.  Every effort was made to ensure accuracy; however, inadvertent computerized transcription errors may be present. HOSPITALIST ATTENDING PHYSICIAN NOTE 10/11/2021 2147PM:    Details of the evaluation - subjective assessment (including medication profile, past medical, family and social history when applicable), examination, review of lab and test data, diagnostic impressions and medical decision making - performed by RACHEL Mason, were discussed with me on the date of service and I agree with clinical information herein unless otherwise noted. The patient has been evaluated by me personally earlier today. Pt reports no fevers, chills,n/v. PHYSICAL EXAM:    Vitals:  /70   Pulse 98   Temp 98.4 °F (36.9 °C) (Oral)   Resp 18   Ht 5' 8\" (1.727 m)   Wt 177 lb 14.4 oz (80.7 kg)   SpO2 98%   BMI 27.05 kg/m²     General:  Appears comfortable. Answers questions appropriately and cooperative with exam  HEENT:  Mucous membranes moist. No erythema, rhinorrhea, or post-nasal drip noted. Neck:  No carotid bruits. Heart:  Rhythm regular at rate of 96  Lungs:  CTA. No wheeze, rales, or rhonchi  Abdomen:  Positive bowel sounds positive. Soft. Non-tender. No guarding, rebound or rigidity. Breast/Rectal/Genitourinary: not pertinent. Extremities:  Negative for lower extremity edema  Skin:  Warm and dry  Vascular: 2/4 Dorsalis Pedis pulses bilaterally. Neuro:  Cranial nerves 2-12 grossly intact, no focal weakness or change in sensation noted. Extraocular muscles intact. Pupils equal, round, reactive to light. I agree with the assessment and plan of RACHEL Mason    Acute respiratory failure with hypoxia([id number]%)POA  covid 19 infection  Copd with likely exacerbation  Orthostatic hypotension(drop in diastolic from 91 to 60 sitting to standing)  Dehydration  hyperlipidemia  htn   bph     With reviewing inflammatory markers which are only mildly elevated, and vital signs without obvious sirs, would favor not starting baricitinib at present time.  Continue to monitor for worsening status Electronically signed by Candy Beard D.O.   Hospitalist  [facility]ist [facility] at [facility]

## 2021-10-11 NOTE — ED PROVIDER NOTES
45-year-old male history of COPD presents to the emergency department with shortness of breath. EMS reported his oxygen level was 80% on arrival and improved with oxygen. Patient recently traveled from [place] last Friday. He states this often happens when he flies to the area from [place]. He states no known Covid symptoms he is not vaccinated for COVID-19. He states no fevers chills nausea vomiting diarrhea or urinary symptoms. Patient states he is on Coumadin    The history is provided by the patient. Shortness of Breath  Severity:  Mild  Onset quality:  Gradual  Duration:  2 days  Timing:  Intermittent  Progression:  Waxing and waning  Chronicity:  New  Relieved by:  Nothing  Worsened by:  Nothing  Ineffective treatments:  None tried  Associated symptoms: wheezing    Associated symptoms: no abdominal pain, no chest pain, no claudication, no cough, no ear pain, no fever, no headaches, no rash, no sore throat, no syncope, no swollen glands and no vomiting         Review of Systems   Constitutional: Negative for chills and fever. HENT: Negative for ear pain, sinus pressure and sore throat. Eyes: Negative for pain, discharge and redness. Respiratory: Positive for shortness of breath and wheezing. Negative for cough. Cardiovascular: Negative for chest pain, claudication and syncope. Gastrointestinal: Negative for abdominal pain, diarrhea, nausea and vomiting. Genitourinary: Negative for dysuria and frequency. Musculoskeletal: Negative for arthralgias and back pain. Skin: Negative for rash and wound. Neurological: Negative for weakness and headaches. Hematological: Negative for adenopathy. All other systems reviewed and are negative. Physical Exam  Constitutional:       Appearance: Normal appearance. HENT:      Head: Normocephalic and atraumatic. Nose: Nose normal.   Eyes:      Extraocular Movements: Extraocular movements intact.       Pupils: Pupils are equal, round, and reactive to light. Cardiovascular:      Rate and Rhythm: Normal rate. Pulses: Normal pulses. Heart sounds: Normal heart sounds. Pulmonary:      Breath sounds: Wheezing present. Comments: 88% on RA in ER   Abdominal:      General: Abdomen is flat. Bowel sounds are normal. There is no distension. Palpations: Abdomen is soft. Tenderness: There is no abdominal tenderness. Musculoskeletal:         General: Normal range of motion. Skin:     Capillary Refill: Capillary refill takes less than 2 seconds. Neurological:      General: No focal deficit present. Mental Status: He is alert and oriented to person, place, and time. Procedures     MDM  Number of Diagnoses or Management Options  Acute respiratory disease due to COVID-19 virus  Chronic obstructive pulmonary disease, unspecified COPD type ([facility]  Diagnosis management comments: Patient seen and examined. Labs and imaging were ordered. Patient Covid positive. He is noted to have likely a COPD exacerbation on top of breathing treatments provided. Given hypoxia he was admitted to the hospital service for further evaluation. Steroids provided here in the emergency department.             --------------------------------------------- PAST HISTORY ---------------------------------------------  Past Medical History:  has a past medical history of Chronic kidney disease, COPD (chronic obstructive pulmonary disease) ([facility] Hyperlipidemia, Hypertension, MI (myocardial infarction) ([facility] and Thyroid disease. Past Surgical History:  has a past surgical history that includes cervical fusion; joint replacement (Bilateral); and Coronary angioplasty with stent. Social History:      Family History: family history is not on file. The patients home medications have been reviewed. Allergies: Patient has no known allergies.     -------------------------------------------------- RESULTS -------------------------------------------------    LABS:  Results for orders placed or performed during the hospital encounter of 10/11/21   COVID-19, Rapid    Specimen: Nasopharyngeal Swab   Result Value Ref Range    SARS-CoV-2, NAAT DETECTED (A) Not Detected   Respiratory Panel, Molecular, with COVID-19 (Restricted: peds pts or suitable admitted adults)    Specimen: Nasopharyngeal   Result Value Ref Range    Adenovirus by PCR Not Detected Not Detected    Bordetella parapertussis by PCR Not Detected Not Detected    Bordetella pertussis by PCR Not Detected Not Detected    Chlamydophilia pneumoniae by PCR Not Detected Not Detected    Coronavirus 229E by PCR Not Detected Not Detected    Coronavirus HKU1 by PCR Not Detected Not Detected    Coronavirus NL63 by PCR Not Detected Not Detected    Coronavirus OC43 by PCR Not Detected Not Detected    SARS-CoV-2, PCR DETECTED (A) Not Detected    Human Metapneumovirus by PCR Not Detected Not Detected    Human Rhinovirus/Enterovirus by PCR Not Detected Not Detected    Influenza A by PCR Not Detected Not Detected    Influenza B by PCR Not Detected Not Detected    Mycoplasma pneumoniae by PCR Not Detected Not Detected    Parainfluenza Virus 1 by PCR Not Detected Not Detected    Parainfluenza Virus 2 by PCR Not Detected Not Detected    Parainfluenza Virus 3 by PCR Not Detected Not Detected    Parainfluenza Virus 4 by PCR Not Detected Not Detected    Respiratory Syncytial Virus by PCR Not Detected Not Detected   Basic Metabolic Panel   Result Value Ref Range    Sodium 138 132 - 146 mmol/L    Potassium 3.8 3.5 - 5.0 mmol/L    Chloride 100 98 - 107 mmol/L    CO2 26 22 - 29 mmol/L    Anion Gap 12 7 - 16 mmol/L    Glucose 107 (H) 74 - 99 mg/dL    BUN 17 6 - 23 mg/dL    CREATININE 1.2 0.7 - 1.2 mg/dL    GFR Non-African American 58 >=60 mL/min/1.73    GFR African American >60     Calcium 9.3 8.6 - 10.2 mg/dL   Troponin   Result Value Ref Range    Troponin, High Sensitivity 43 (H) 0 - 11 ng/L   Brain Natriuretic Peptide   Result Value Ref Range    Pro-BNP 1,024 (H) 0 - 450 pg/mL   Protime-INR   Result Value Ref Range    Protime 13.7 (H) 9.3 - 12.4 sec    INR 1.2    SPECIMEN REJECTION   Result Value Ref Range    Rejected Test CBCWD     Reason for Rejection see below    Troponin   Result Value Ref Range    Troponin, High Sensitivity 42 (H) 0 - 11 ng/L   D-Dimer, Quantitative   Result Value Ref Range    D-Dimer, Quant 327 ng/mL DDU   C-Reactive Protein   Result Value Ref Range    CRP 2.6 (H) 0.0 - 0.4 mg/dL   Procalcitonin   Result Value Ref Range    Procalcitonin 0.11 (H) 0.00 - 0.08 ng/mL   CBC Auto Differential   Result Value Ref Range    WBC 5.8 4.5 - 11.5 E9/L    RBC 3.64 (L) 3.80 - 5.80 E12/L    Hemoglobin 12.2 (L) 12.5 - 16.5 g/dL    Hematocrit 36.8 (L) 37.0 - 54.0 %    .1 (H) 80.0 - 99.9 fL    MCH 33.5 26.0 - 35.0 pg    MCHC 33.2 32.0 - 34.5 %    RDW 13.6 11.5 - 15.0 fL    Platelets 016 441 - 300 E9/L    MPV 9.9 7.0 - 12.0 fL    Neutrophils % 78.3 43.0 - 80.0 %    Immature Granulocytes % 0.5 0.0 - 5.0 %    Lymphocytes % 8.5 (L) 20.0 - 42.0 %    Monocytes % 10.5 2.0 - 12.0 %    Eosinophils % 1.7 0.0 - 6.0 %    Basophils % 0.5 0.0 - 2.0 %    Neutrophils Absolute 4.53 1.80 - 7.30 E9/L    Immature Granulocytes # 0.03 E9/L    Lymphocytes Absolute 0.49 (L) 1.50 - 4.00 E9/L    Monocytes Absolute 0.61 0.10 - 0.95 E9/L    Eosinophils Absolute 0.10 0.05 - 0.50 E9/L    Basophils Absolute 0.03 0.00 - 0.20 E9/L    Anisocytosis 1+    LACTATE DEHYDROGENASE   Result Value Ref Range     (H) 135 - 225 U/L   Sedimentation Rate   Result Value Ref Range    Sed Rate 9 0 - 15 mm/Hr   C-Reactive Protein   Result Value Ref Range    CRP 2.8 (H) 0.0 - 0.4 mg/dL   Troponin   Result Value Ref Range    Troponin, High Sensitivity 43 (H) 0 - 11 ng/L   LACTATE DEHYDROGENASE   Result Value Ref Range     135 - 225 U/L   EKG 12 Lead   Result Value Ref Range    Ventricular Rate 77 BPM    Atrial Rate 77 BPM obstructive pulmonary disease, unspecified COPD type ([facility]        Disposition:  Patient's disposition: Admit to telemetry  Patient's condition is fair.          Danny Callaway DO  10/11/21 [id number]

## 2021-10-12 LAB
ANION GAP SERPL CALCULATED.3IONS-SCNC: 9 MMOL/L (ref 7–16)
APTT: 28.4 SEC (ref 24.5–35.1)
BUN BLDV-MCNC: 22 MG/DL (ref 6–23)
C-REACTIVE PROTEIN: 4.3 MG/DL (ref 0–0.4)
C-REACTIVE PROTEIN: 4.8 MG/DL (ref 0–0.4)
CALCIUM SERPL-MCNC: 9.2 MG/DL (ref 8.6–10.2)
CHLORIDE BLD-SCNC: 101 MMOL/L (ref 98–107)
CO2: 27 MMOL/L (ref 22–29)
CREAT SERPL-MCNC: 1.1 MG/DL (ref 0.7–1.2)
D DIMER: <200 NG/ML DDU
EKG ATRIAL RATE: 78 BPM
EKG P AXIS: 43 DEGREES
EKG P-R INTERVAL: 170 MS
EKG Q-T INTERVAL: 426 MS
EKG QRS DURATION: 124 MS
EKG QTC CALCULATION (BAZETT): 485 MS
EKG R AXIS: -15 DEGREES
EKG T AXIS: 56 DEGREES
EKG VENTRICULAR RATE: 78 BPM
FERRITIN: 133 NG/ML
FIBRINOGEN: 469 MG/DL (ref 225–540)
GFR AFRICAN AMERICAN: >60
GFR NON-AFRICAN AMERICAN: >60 ML/MIN/1.73
GLUCOSE BLD-MCNC: 119 MG/DL (ref 74–99)
HCT VFR BLD CALC: 35.7 % (ref 37–54)
HEMOGLOBIN: 11.6 G/DL (ref 12.5–16.5)
LACTATE DEHYDROGENASE: 221 U/L (ref 135–225)
LACTIC ACID: 1.4 MMOL/L (ref 0.5–2.2)
MCH RBC QN AUTO: 33 PG (ref 26–35)
MCHC RBC AUTO-ENTMCNC: 32.5 % (ref 32–34.5)
MCV RBC AUTO: 101.7 FL (ref 80–99.9)
PDW BLD-RTO: 13.5 FL (ref 11.5–15)
PLATELET # BLD: 147 E9/L (ref 130–450)
PMV BLD AUTO: 10 FL (ref 7–12)
POTASSIUM SERPL-SCNC: 3.9 MMOL/L (ref 3.5–5)
PROCALCITONIN: 0.1 NG/ML (ref 0–0.08)
RBC # BLD: 3.51 E12/L (ref 3.8–5.8)
SODIUM BLD-SCNC: 137 MMOL/L (ref 132–146)
TROPONIN, HIGH SENSITIVITY: 37 NG/L (ref 0–11)
WBC # BLD: 4.8 E9/L (ref 4.5–11.5)

## 2021-10-12 PROCEDURE — 84145 PROCALCITONIN (PCT): CPT

## 2021-10-12 PROCEDURE — 99232 SBSQ HOSP IP/OBS MODERATE 35: CPT | Performed by: INTERNAL MEDICINE

## 2021-10-12 PROCEDURE — APPSS45 APP SPLIT SHARED TIME 31-45 MINUTES: Performed by: NURSE PRACTITIONER

## 2021-10-12 PROCEDURE — 85730 THROMBOPLASTIN TIME PARTIAL: CPT

## 2021-10-12 PROCEDURE — 85378 FIBRIN DEGRADE SEMIQUANT: CPT

## 2021-10-12 PROCEDURE — 83615 LACTATE (LD) (LDH) ENZYME: CPT

## 2021-10-12 PROCEDURE — 86140 C-REACTIVE PROTEIN: CPT

## 2021-10-12 PROCEDURE — 2700000000 HC OXYGEN THERAPY PER DAY

## 2021-10-12 PROCEDURE — 6370000000 HC RX 637 (ALT 250 FOR IP): Performed by: NURSE PRACTITIONER

## 2021-10-12 PROCEDURE — 84484 ASSAY OF TROPONIN QUANT: CPT

## 2021-10-12 PROCEDURE — 2060000000 HC ICU INTERMEDIATE R&B

## 2021-10-12 PROCEDURE — 82728 ASSAY OF FERRITIN: CPT

## 2021-10-12 PROCEDURE — 85384 FIBRINOGEN ACTIVITY: CPT

## 2021-10-12 PROCEDURE — 87449 NOS EACH ORGANISM AG IA: CPT

## 2021-10-12 PROCEDURE — 6360000002 HC RX W HCPCS: Performed by: NURSE PRACTITIONER

## 2021-10-12 PROCEDURE — 83605 ASSAY OF LACTIC ACID: CPT

## 2021-10-12 PROCEDURE — 2580000003 HC RX 258: Performed by: NURSE PRACTITIONER

## 2021-10-12 PROCEDURE — 80048 BASIC METABOLIC PNL TOTAL CA: CPT

## 2021-10-12 PROCEDURE — 36415 COLL VENOUS BLD VENIPUNCTURE: CPT

## 2021-10-12 PROCEDURE — 85027 COMPLETE CBC AUTOMATED: CPT

## 2021-10-12 RX ADMIN — ENOXAPARIN SODIUM 40 MG: 40 INJECTION SUBCUTANEOUS at 09:08

## 2021-10-12 RX ADMIN — MEMANTINE HYDROCHLORIDE 10 MG: 10 TABLET, FILM COATED ORAL at 21:35

## 2021-10-12 RX ADMIN — METOPROLOL SUCCINATE 25 MG: 25 TABLET, EXTENDED RELEASE ORAL at 09:08

## 2021-10-12 RX ADMIN — DEXAMETHASONE SODIUM PHOSPHATE 6 MG: 10 INJECTION INTRAMUSCULAR; INTRAVENOUS at 09:09

## 2021-10-12 RX ADMIN — MEMANTINE HYDROCHLORIDE 10 MG: 10 TABLET, FILM COATED ORAL at 09:08

## 2021-10-12 RX ADMIN — SODIUM CHLORIDE, PRESERVATIVE FREE 10 ML: 5 INJECTION INTRAVENOUS at 09:10

## 2021-10-12 RX ADMIN — ALBUTEROL SULFATE 2 PUFF: 90 AEROSOL, METERED RESPIRATORY (INHALATION) at 11:05

## 2021-10-12 RX ADMIN — TAMSULOSIN HYDROCHLORIDE 0.4 MG: 0.4 CAPSULE ORAL at 21:35

## 2021-10-12 RX ADMIN — ALBUTEROL SULFATE 2 PUFF: 90 AEROSOL, METERED RESPIRATORY (INHALATION) at 06:04

## 2021-10-12 RX ADMIN — ALBUTEROL SULFATE 2 PUFF: 90 AEROSOL, METERED RESPIRATORY (INHALATION) at 21:37

## 2021-10-12 RX ADMIN — ESCITALOPRAM OXALATE 10 MG: 10 TABLET ORAL at 21:35

## 2021-10-12 RX ADMIN — ASPIRIN 81 MG: 81 TABLET, COATED ORAL at 09:08

## 2021-10-12 RX ADMIN — PANTOPRAZOLE SODIUM 40 MG: 40 TABLET, DELAYED RELEASE ORAL at 21:35

## 2021-10-12 RX ADMIN — CLOPIDOGREL BISULFATE 75 MG: 75 TABLET ORAL at 09:08

## 2021-10-12 RX ADMIN — LEVOTHYROXINE SODIUM 75 MCG: 75 TABLET ORAL at 06:04

## 2021-10-12 NOTE — CARE COORDINATION
COVID + 10/11. Phone conversation w/ patient. Explained role of  and plan of care. Lives in [place] with his wife- drove from [place] to visit his wife's son. [facility] has home O2 -liter flow and provider unknown- [facility] [place] only uses O2 occasionally. Currently requiring O2 4lNC. No Hx HHC or [facility]LEY. Uses walker. Awaiting PT/OT evals. Per pt, plan is for his wife to drive them back to [place] when he feels better- is declining oxygen on discharge at the present. Needs code status addressed.  Will follow [employer]nce Oseguera RN case manager

## 2021-10-12 NOTE — PROGRESS NOTES
[facility] Progress Note    Admitting Date and Time: 10/11/2021  6:27 AM  Admit Dx: Acute respiratory failure due to COVID-19 (Formerly [facility]) [U07.1, J96.00]    Subjective:  Patient is being followed for Acute respiratory failure due to COVID-19 ([address] [U07.1, J96.00]     Pt awake and alert, sitting up in chair in room in no acute distress  Reporting overall he feels better  Currently on RA for trial off 02- per nursing pt has been on 2 L NC  Denies dizziness  Reporting that he does have 02 at home if needed- did not bring with him from [place] and has not used \" in a while\"  Appetite has been good  Does not recall what vaccine he received         ROS: denies fever, chills, cp, sob, n/v, HA unless stated above.       dexamethasone  6 mg IntraVENous Q24H    aspirin  81 mg Oral Daily    clopidogrel  75 mg Oral Daily    escitalopram  10 mg Oral Daily    levothyroxine  75 mcg Oral Daily    memantine  10 mg Oral BID    metoprolol succinate  25 mg Oral Daily    pantoprazole  40 mg Oral Nightly    tamsulosin  0.4 mg Oral Nightly    enoxaparin  40 mg SubCUTAneous Daily    influenza virus vaccine  0.5 mL IntraMUSCular Prior to discharge     sodium chloride flush, 10 mL, PRN  albuterol sulfate HFA, 2 puff, Q4H PRN  cyclobenzaprine, 5 mg, TID PRN  acetaminophen, 650 mg, Q6H PRN   Or  acetaminophen, 650 mg, Q6H PRN         Objective:    /86   Pulse 70   Temp 98.1 °F (36.7 °C) (Oral)   Resp 20   Ht 5' 8\" (1.727 m)   Wt 177 lb 14.4 oz (80.7 kg)   SpO2 96%   BMI 27.05 kg/m²     General Appearance: alert and oriented to person, place and time and in no acute distress  Skin: warm and dry,  Ecchymosis over left eye   Head: normocephalic and atraumatic  Neck: neck supple and non tender without mass   Pulmonary/Chest: clear to auscultation bilaterally-  Cardiovascular: normal rate, normal S1 and S2 and no carotid bruits  Abdomen: soft, non-tender, non-distended, normal bowel sounds,  Extremities: no cyanosis, no clubbing and no edema  Neurologic: speech normal         Recent Labs     10/11/21  0651 10/12/21  0455    137   K 3.8 3.9    101   CO2 26 27   BUN 17 22   CREATININE 1.2 1.1   GLUCOSE 107* 119*   CALCIUM 9.3 9.2       Recent Labs     10/11/21  0748 10/12/21  0455   WBC 5.8 4.8   RBC 3.64* 3.51*   HGB 12.2* 11.6*   HCT 36.8* 35.7*   .1* 101.7*   MCH 33.5 33.0   MCHC 33.2 32.5   RDW 13.6 13.5    147   MPV 9.9 10.0         Assessment:    Active Problems:    Acute respiratory failure due to COVID-19 (HCC)    Acute respiratory failure with hypoxia (HCC)    COVID-19 virus infection    COPD exacerbation (HCC)    Orthostatic hypotension  Resolved Problems:    * No resolved hospital problems. *      Plan:  1. Acute on chronic respiratory failure with hypoxia: Pt presented to the ER for c/o sob over the last few days. Visiting here from [place]. Reporting he is vaccinated- does not know which vaccine he received. Reporting he had his 2nd 3 months ago. . In ER 02 sat 86% on RA. Placed on 4 L NC initially. Currently on 2L. Continue to wean as tolerated. Pt reporting he does have 02 at home. However he only \" wears occasionally. \" Per chart review- wife reporting nocturnal 02. Will ambulate on RA. 2. COVID 19 Infection: CTA lungs reviewed showing atelectasis. No evidence of pneumonia. Procal 0.10. Follow inflammatory markers. D dimer less than 200. CRP 4.8. Inflammatory markers are only mildly elevated without obvious SIRS. Decision was made to hold off on starting baricitinib on admisision. Vitamin supplementation. Continue IV decadron. Continue Lovenox per protocol. Encourage SMI. 3. COPD with possible exacerbation: continue IV steroids- switch to PO. Wheezing noted on exam    4. Orthostatic hypotension/ dehydration : drop in diastolic from 91 to 60 on admission. Sitting to standing. Rehydrated. Reporting appetite is good and drinking fluids. Orthostatic vitals last night were ok. 5. 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 Depression: on lexapro- on hold due to elevated QTC    7. HTN : continue toprol xl    8. H/o MI: no active chest pain: asa/ plavix    9. BPH on flomax- may be contributing to orthostatic bp    10. PT reporting he is visiting from [place]. Drove in with his wife. 11. Deconditioning: PT/OT- await PT/OT eval    12. Elevated QTC: trending down. Repeat EKG noted. Lexapro has been on hold due to elevated QTC-- likely can resume soon  Discussed with pharmacy and can resume today. NOTE: This report was transcribed using voice recognition software. Every effort was made to ensure accuracy; however, inadvertent computerized transcription errors may be present. Electronically signed by KATLYN Nixon on 10/12/2021 at 10:11 AM  HOSPITALIST ATTENDING PHYSICIAN NOTE 10/12/2021 2112PM:    Details of the evaluation - subjective assessment (including medication profile, past medical, family and social history when applicable), examination, review of lab and test data, diagnostic impressions and medical decision making - performed by KATLYN Nixon, were discussed with me on the date of service and I agree with clinical information herein unless otherwise noted. The patient has been evaluated by me personally earlier today. Pt reports no fevers, chills,n/v.     Exam: heart reg at rate of 72, lungs cta, abd pos bs soft nt, ext neg for le edema    I agree with the assessment and plan of KATLYN Nixon. Acute respiratory failure with hypoxia([id number]%)POA  covid 19 infection  Copd exacerbation  Orthostatic hypotension(drop in diastolic from 91 to 60 sitting to standing)  Dehydration  Hyperlipidemia  Hyperglycemia likely due to stress/steroids  htn   bph     On further review, overall orthostatic bp's better but will continue fluids overnight. Will change steroids to po. If pt continues to improve overall, tentative discharge tomorrow.

## 2021-10-12 NOTE — PROGRESS NOTES
Spoke with pt wife over the phone, updated on pt status and plan of care. She reports pt use o2 @2L/nc at hs and prn for sob, uses a walker. Pt wife reports she tested (+) for COVID also and is currently on steroids and will get an Infusion on tomorrow.

## 2021-10-12 NOTE — PROGRESS NOTES
Ekg obtained, . [facility] NP made aware, will discuss with Attending Dr. Parisa Peterson for order of Lexapro.

## 2021-10-13 VITALS
DIASTOLIC BLOOD PRESSURE: 90 MMHG | TEMPERATURE: 98 F | RESPIRATION RATE: 20 BRPM | HEART RATE: 74 BPM | OXYGEN SATURATION: 93 % | WEIGHT: 177.9 LBS | HEIGHT: 68 IN | BODY MASS INDEX: 26.96 KG/M2 | SYSTOLIC BLOOD PRESSURE: 152 MMHG

## 2021-10-13 LAB
ALBUMIN SERPL-MCNC: 3.8 G/DL (ref 3.5–5.2)
ALP BLD-CCNC: 61 U/L (ref 40–129)
ALT SERPL-CCNC: 18 U/L (ref 0–40)
ANION GAP SERPL CALCULATED.3IONS-SCNC: 8 MMOL/L (ref 7–16)
AST SERPL-CCNC: 58 U/L (ref 0–39)
BILIRUB SERPL-MCNC: 0.6 MG/DL (ref 0–1.2)
BUN BLDV-MCNC: 24 MG/DL (ref 6–23)
C-REACTIVE PROTEIN: 2.2 MG/DL (ref 0–0.4)
CALCIUM SERPL-MCNC: 9.3 MG/DL (ref 8.6–10.2)
CHLORIDE BLD-SCNC: 102 MMOL/L (ref 98–107)
CO2: 30 MMOL/L (ref 22–29)
CREAT SERPL-MCNC: 1.2 MG/DL (ref 0.7–1.2)
D DIMER: 244 NG/ML DDU
GFR AFRICAN AMERICAN: >60
GFR NON-AFRICAN AMERICAN: 58 ML/MIN/1.73
GLUCOSE BLD-MCNC: 89 MG/DL (ref 74–99)
HCT VFR BLD CALC: 33.5 % (ref 37–54)
HEMOGLOBIN: 11.6 G/DL (ref 12.5–16.5)
L. PNEUMOPHILA SEROGP 1 UR AG: NORMAL
MCH RBC QN AUTO: 34.5 PG (ref 26–35)
MCHC RBC AUTO-ENTMCNC: 34.6 % (ref 32–34.5)
MCV RBC AUTO: 99.7 FL (ref 80–99.9)
PDW BLD-RTO: 13.6 FL (ref 11.5–15)
PLATELET # BLD: 236 E9/L (ref 130–450)
PMV BLD AUTO: 11.6 FL (ref 7–12)
POTASSIUM SERPL-SCNC: 3.7 MMOL/L (ref 3.5–5)
RBC # BLD: 3.36 E12/L (ref 3.8–5.8)
REASON FOR REJECTION: NORMAL
REJECTED TEST: NORMAL
SODIUM BLD-SCNC: 140 MMOL/L (ref 132–146)
STREP PNEUMONIAE ANTIGEN, URINE: NORMAL
TOTAL PROTEIN: 6.2 G/DL (ref 6.4–8.3)
WBC # BLD: 5.7 E9/L (ref 4.5–11.5)

## 2021-10-13 PROCEDURE — 36415 COLL VENOUS BLD VENIPUNCTURE: CPT

## 2021-10-13 PROCEDURE — 97530 THERAPEUTIC ACTIVITIES: CPT

## 2021-10-13 PROCEDURE — 85378 FIBRIN DEGRADE SEMIQUANT: CPT

## 2021-10-13 PROCEDURE — 6370000000 HC RX 637 (ALT 250 FOR IP): Performed by: NURSE PRACTITIONER

## 2021-10-13 PROCEDURE — 85027 COMPLETE CBC AUTOMATED: CPT

## 2021-10-13 PROCEDURE — 6360000002 HC RX W HCPCS: Performed by: NURSE PRACTITIONER

## 2021-10-13 PROCEDURE — 97161 PT EVAL LOW COMPLEX 20 MIN: CPT

## 2021-10-13 PROCEDURE — 80053 COMPREHEN METABOLIC PANEL: CPT

## 2021-10-13 PROCEDURE — 99239 HOSP IP/OBS DSCHRG MGMT >30: CPT | Performed by: INTERNAL MEDICINE

## 2021-10-13 PROCEDURE — 86140 C-REACTIVE PROTEIN: CPT

## 2021-10-13 PROCEDURE — 2700000000 HC OXYGEN THERAPY PER DAY

## 2021-10-13 RX ORDER — DEXAMETHASONE 6 MG/1
6 TABLET ORAL DAILY
Qty: 7 TABLET | Refills: 0 | Status: SHIPPED | OUTPATIENT
Start: 2021-10-14 | End: 2021-10-21

## 2021-10-13 RX ADMIN — ASPIRIN 81 MG: 81 TABLET, COATED ORAL at 10:01

## 2021-10-13 RX ADMIN — ENOXAPARIN SODIUM 40 MG: 40 INJECTION SUBCUTANEOUS at 10:02

## 2021-10-13 RX ADMIN — LEVOTHYROXINE SODIUM 75 MCG: 75 TABLET ORAL at 06:10

## 2021-10-13 RX ADMIN — DEXAMETHASONE 6 MG: 4 TABLET ORAL at 10:01

## 2021-10-13 RX ADMIN — ALBUTEROL SULFATE 2 PUFF: 90 AEROSOL, METERED RESPIRATORY (INHALATION) at 14:02

## 2021-10-13 RX ADMIN — ALBUTEROL SULFATE 2 PUFF: 90 AEROSOL, METERED RESPIRATORY (INHALATION) at 10:10

## 2021-10-13 RX ADMIN — ESCITALOPRAM OXALATE 10 MG: 10 TABLET ORAL at 10:02

## 2021-10-13 RX ADMIN — CLOPIDOGREL BISULFATE 75 MG: 75 TABLET ORAL at 10:01

## 2021-10-13 RX ADMIN — METOPROLOL SUCCINATE 25 MG: 25 TABLET, EXTENDED RELEASE ORAL at 10:01

## 2021-10-13 RX ADMIN — MEMANTINE HYDROCHLORIDE 10 MG: 10 TABLET, FILM COATED ORAL at 10:01

## 2021-10-13 NOTE — PROGRESS NOTES
Physical Therapy    Facility/Department: [address] INTERNAL MEDICINE 2  Initial Assessment    NAME: Mallory Pina  : 1935  MRN: [id number]    Date of Service: 10/13/2021       REQUIRES PT FOLLOW UP: Yes       Patient Diagnosis(es): The primary encounter diagnosis was Acute respiratory disease due to COVID-19 virus. A diagnosis of Chronic obstructive pulmonary disease, unspecified COPD type ([address] was also pertinent to this visit. has a past medical history of Chronic kidney disease, COPD (chronic obstructive pulmonary disease) ([address] Hyperlipidemia, Hypertension, MI (myocardial infarction) ([address] and Thyroid disease. has a past surgical history that includes cervical fusion; joint replacement (Bilateral); and Coronary angioplasty with stent. Evaluating Therapist: Filomena Young, PT     Referring Provider:  RACHEL Canada    PT order : Pt eval and treat     Room #:  655   DIAGNOSIS:  Acute resp failure due to COVID   PRECAUTIONS: falls, droplet plus ,O2@ 2 LNC     Social:  Pt lives with  Wife  in a  1  floor plan  Prior to admission pt walked with rollator . Has home O2    Info per pt. He is mildly confused      Initial Evaluation  Date:  10/13/2021  Treatment      Short Term/ Long Term   Goals   Was pt agreeable to Eval/treatment? yes      Does pt have pain?  none reported      Bed Mobility  Rolling:  NT   Supine to sit:  Min assist   Sit to supine:  Min assist   Scooting: min assist    S/I    Transfers Sit to stand: min assist   Stand to sit: CGA   Stand pivot:  NT    SBA    Ambulation     50  feet with  ww with CGA    100  feet with  ww  with  SBA        Stair negotiation: ascended and descended NT     TBA as able    LE ROM  WFL     LE strength  4- to 4/ 5      AM- PAC RAW score  17/ 24            Pt is alert and Oriented x  2-3 . Confusion noted      Balance:  CGA , no LOB but mildly unsteady   Endurance: decreased   Bed/Chair alarm:   Yes      ASSESSMENT  Pt displays functional ability as noted in the objective portion of this evaluation. Conditions Requiring Skilled Therapeutic Intervention:    [x]Decreased strength     []Decreased ROM  [x]Decreased functional mobility  [x]Decreased balance   [x]Decreased endurance   [x]Decreased posture  []Decreased sensation  []Decreased coordination   []Decreased vision  [x]Decreased safety awareness   []Increased pain       Treatment/Education:    Pt in bed upon arrival; agreeable to PT. Needed assist with trunk with supine to sit. Cues for hand placement with transfers and ww safety with gait. Pt reports he prefers his rollator over the ww. Min assist needed with L LE with sit to supine. O2@ 2 LNC / pulse ox at rest 96%, decreased to 89% with gait, and recovered to 93%. Cues given for proper breathing technique     Pt educated on fall risk,  Safety with mobility        Patient response to education:   Pt verbalized understanding Pt demonstrated skill Pt requires further education in this area    x With cues  x       Comments:  Pt left  In bed after session, with call light in reach. Rehab potential is Good for reaching above PT goals. Pts/ family goals   1. None stated     Patient and or family understand(s) diagnosis, prognosis, and plan of care. -  Yes? PLAN  PT care will be provided in accordance with the objectives noted above. Whenever appropriate, clear delegation orders will be provided for nursing staff. Exercises and functional mobility practice will be used as well as appropriate assistive devices or modalities to obtain goals. Patient and family education will also be administered as needed.         PLAN OF CARE:    Current Treatment Recommendations     [x] Strengthening to improve independence with functional mobility   [] ROM to improve independence with functional mobility   [x] Balance Training to improve static/dynamic balance and to reduce fall risk  [x] Endurance Training to improve activity tolerance during functional mobility   [x] Transfer Training to improve safety and independence with all functional transfers   [x] Gait Training to improve gait mechanics, endurance and assess need for appropriate assistive device  [] Stair Training in preparation for safe discharge home and/or into the community   [x] Positioning to prevent skin breakdown and contractures  [x] Safety and Education Training   [x] Patient/Caregiver Education   [] HEP  [] Other     Frequency of treatments will be 2-5x/week x  7-14 days. Time in: 1442   Time out:  1505       Evaluation Time includes thorough review of current medical information, gathering information on past medical history/social history and prior level of function, completion of standardized testing/informal observation of tasks, assessment of data and education on plan of care and goals.     CPT codes:  [x] Low Complexity PT evaluation 25440  [] Moderate Complexity PT evaluation 25907  [] High Complexity PT evaluation 05979  [] PT Re-evaluation 65205  [] Gait training 96969  minutes  [x] Therapeutic activities 70496 10 minutes  [] Therapeutic exercises 72374  minutes  [] Neuromuscular reeducation 37485  minutes       Arpit Wallace number:  PT [id number]

## 2021-10-13 NOTE — CARE COORDINATION
COVID + 10/11. Requiring intermittent O2 2lNC vs room air which is pt's home baseline. Need CODE STATUS entered into the computer in order to complete ACP- nursing aware. PT/OT evals pending. Plan remains to return home to [place] with wife driving on discharge- pt is declining portable O2 tank.  Will follow Brayden Rose RN case manager

## 2021-10-14 NOTE — DISCHARGE SUMMARY
[facility] EMERGENCY SERVICE Physician Discharge Summary           Schedule an appointment as soon as possible for a visit in 1 week            **FOLLOW UP WITH PCP IN [facility]**      Activity level: as adonis    Diet: No diet orders on file    Dispo:home    Condition at discharge: fair          Patient ID:  Jl Agudelo  [id number]  22 y.o.  1935    Admit date: 10/11/2021    Discharge date and time:  10/13/2021  11:49 PM    Admission Diagnoses: Active Problems:    Acute respiratory failure due to COVID-19 [facility])    Acute respiratory failure with hypoxia (HCC)    COVID-19 virus infection    COPD exacerbation ([facility])    Orthostatic hypotension  Resolved Problems:    * No resolved hospital problems. *      Discharge Diagnoses: Active Problems:    Acute respiratory failure due to COVID-19 [facility])    Acute respiratory failure with hypoxia (HCC)    COVID-19 virus infection    COPD exacerbation ([facility])    Orthostatic hypotension  Resolved Problems:    * No resolved hospital problems. *    Acute respiratory failure with hypoxia([id number]%)POA  covid 19 infection  Copd exacerbation  Orthostatic hypotension(drop in diastolic from 91 to 60 sitting to standing)  Dehydration  Hyperlipidemia  Hyperglycemia likely due to stress/steroids  htn   bph       Consults:  None    Procedures: none    Hospital Course: Patient was admitted with Acute respiratory failure due to COVID-19 ([facility] [U07.1, J96.00]. Patient is an 82 y.o. male with a history of bph,mi,hpl,htn,copd,hypothyroid presents with sob, somewhat of a poor historian. He states his breathing has been worsening over the last couple of days. He states he is originally from [facility] but here in [facility]. Does have hx of asthma, breathing worsening over the last few days. Has harsh nonproductive cough. Denies any active chest pain. Reports having b/l leg weakness and falling at home, roughly 2 times. Denies any injuries. Does have some bruising to left arm. Initially was found to be hypoxic in ED with spo2 of 86% on RA. Started on 4LNC and spo2 inc to mid 90's. Was also given neb and steroid. CXR negative. CTA chest was also negative. He was + for covid 19. He states he is fully vaccinated. He currently reports feeling \"okay. \" he denies any sob with rest but does get mildly sob w/ exertion. He is afebrile. No chills or chest pain. No abd pain. No edema. Plan to admit for further evaluation and monitoring. Pt seen and examined. Pt had improved on steroids and iv fluids. On day of discharge, pt denied fevers, chills,n/v. Discharge planning d/w pt. Discussed with pt about covid and association with possible blood clot. Pt opting to stay active and monitor for blood clot. Did encourage pt to take many rest breaks from being in the car which pt states he will do. Time given for questions and all questions answered. Discharge Exam:  Vitals:    10/12/21 2136 10/13/21 0101 10/13/21 0945 10/13/21 1629   BP: 131/82 (!) 145/83 (!) 170/78 (!) 152/90   Pulse: 75 77 78 74   Resp: 20 20 18 20   Temp: 97.3 °F (36.3 °C)  97.8 °F (36.6 °C) 98 °F (36.7 °C)   TempSrc: Oral Oral Oral Oral   SpO2: 94% 98% 97% 93%   Weight:       Height:           Skin: warm and dry, no rash or erythema  Pulmonary/Chest: clear to auscultation bilaterally- no wheezes, rales or rhonchi, normal air movement, no respiratory distress  Cardiovascular: rhythm reg at rate of 76  Abdomen: soft, non-tender, non-distended, normal bowel sounds, no masses or organomegaly  Extremities: no cyanosis, no clubbing and no edema  I/O last 3 completed shifts:  In: -   Out: 650 [Urine:650]  No intake/output data recorded.       LABS:  Recent Labs     10/11/21  0651 10/12/21  0455 10/13/21  1130    137 140   K 3.8 3.9 3.7    101 102   CO2 26 27 30*   BUN 17 22 24*   CREATININE 1.2 1.1 1.2   GLUCOSE 107* 119* 89   CALCIUM 9.3 9.2 9.3       Recent Labs     10/11/21  0748 10/12/21  0455 10/13/21  0115   WBC 5.8 4.8 5.7   RBC 3.64* 3.51* 3.36*   HGB 12.2* 11.6* 11.6*   HCT 36.8* 35.7* 33.5*   .1* 101.7* 99.7   MCH 33.5 33.0 34.5   MCHC 33.2 32.5 34.6*   RDW 13.6 13.5 13.6    147 236   MPV 9.9 10.0 11.6       No results for input(s): POCGLU in the last 72 hours. CBC:   Lab Results   Component Value Date    WBC 5.7 10/13/2021    RBC 3.36 10/13/2021    HGB 11.6 10/13/2021    HCT 33.5 10/13/2021    MCV 99.7 10/13/2021    MCH 34.5 10/13/2021    MCHC 34.6 10/13/2021    RDW 13.6 10/13/2021     10/13/2021    MPV 11.6 10/13/2021     CMP:    Lab Results   Component Value Date     10/13/2021    K 3.7 10/13/2021     10/13/2021    CO2 30 10/13/2021    BUN 24 10/13/2021    CREATININE 1.2 10/13/2021    GFRAA >60 10/13/2021    LABGLOM 58 10/13/2021    GLUCOSE 89 10/13/2021    PROT 6.2 10/13/2021    LABALBU 3.8 10/13/2021    CALCIUM 9.3 10/13/2021    BILITOT 0.6 10/13/2021    ALKPHOS 61 10/13/2021    AST 58 10/13/2021    ALT 18 10/13/2021       Imaging:   CTA PULMONARY W CONTRAST   Final Result   1. There is no evidence of a pulmonary embolus. 2. There are no findings of pneumonia   3. Chronic elevation of left hemidiaphragm with adjacent compression   atelectasis within the left lung base posteriorly   4. 2 linear densities within the left upper lobe most consistent with linear   atelectasis. XR CHEST PORTABLE   Final Result   1. Significant elevation of left hemidiaphragm. I suspect there is adjacent   compression atelectasis. 2. There is no gross consolidation within the left upper lobe or right lung.              Patient Instructions:      Medication List      START taking these medications    dexamethasone 6 MG tablet  Commonly known as: DECADRON  Take 1 tablet by mouth daily for 7 days  Start taking on: October 14, 2021        Daryl Meigs taking these medications    aspirin 81 MG EC tablet     clopidogrel 75 MG tablet  Commonly known as: PLAVIX     Crestor 5 MG tablet  Generic drug: rosuvastatin     cyclobenzaprine 5 MG tablet  Commonly known as: FLEXERIL     fenofibrate 145 MG tablet  Commonly known as: TRICOR     furosemide 40 MG tablet  Commonly known as: LASIX     HYDROcodone-acetaminophen  MG per tablet  Commonly known as: NORCO     levothyroxine 75 MCG tablet  Commonly known as: SYNTHROID     Lexapro 10 MG tablet  Generic drug: escitalopram     memantine 10 MG tablet  Commonly known as: NAMENDA     metoprolol succinate 25 MG extended release tablet  Commonly known as: TOPROL XL     pantoprazole 40 MG tablet  Commonly known as: PROTONIX     tamsulosin 0.4 MG capsule  Commonly known as: FLOMAX     tiotropium 18 MCG inhalation capsule  Commonly known as: SPIRIVA     Ventolin  (90 Base) MCG/ACT inhaler  Generic drug: albuterol sulfate HFA           Where to Get Your Medications      These medications were sent to [address]    Phone: [phone]   · dexamethasone 6 MG tablet           Total time for discharge is 37 min    Signed:  Electronically signed by Irine Hodgkins, DO on 10/13/2021 at 11:49 PM

## 2021-11-02 ENCOUNTER — CARE COORDINATION (OUTPATIENT)
Dept: CASE MANAGEMENT | Age: 86
End: 2021-11-02

## 2021-11-02 NOTE — CARE COORDINATION
No call made. Per Jj Zavala [facility]  Member ID  [id number]  Date Of Birth  1935  Age  82  Covid exclusion alert received for patient during 10/11/21 acute stay - [facility] will not follow this member. Noted as d/c home on 10/13/21. No longer qualifies for BPCI, will resolve episode.

## 2022-06-15 NOTE — PATIENT DISCUSSION
1 day PO: Patient is doing well post-operatively. The importance of post-op drop compliance was emphasized. Drop schedule reviewed with patient. Patient to call if any visual changes or concerns. Valt[employer] Counseling: I discussed with the patient the risks of valacyclovir including but not limited to kidney damage, nausea, vomiting and severe allergy.  The patient understands that if the infection seems to be worsening or is not improving, they are to call.

## 2023-12-08 NOTE — PATIENT DISCUSSION
Symptoms of Retinal tear and detachment reviewed. Patient understands to call immediately with any such symptoms. chlorhexidine/Adherence to aseptic technique: hand hygiene prior to donning barriers (gown, gloves), don cap and mask, sterile drape over patient